# Patient Record
Sex: MALE | Race: ASIAN | URBAN - METROPOLITAN AREA
[De-identification: names, ages, dates, MRNs, and addresses within clinical notes are randomized per-mention and may not be internally consistent; named-entity substitution may affect disease eponyms.]

---

## 2017-04-11 ENCOUNTER — OFFICE VISIT (OUTPATIENT)
Dept: FAMILY MEDICINE | Facility: CLINIC | Age: 22
End: 2017-04-11

## 2017-04-11 VITALS
HEART RATE: 73 BPM | DIASTOLIC BLOOD PRESSURE: 69 MMHG | WEIGHT: 125 LBS | BODY MASS INDEX: 19.62 KG/M2 | SYSTOLIC BLOOD PRESSURE: 108 MMHG | TEMPERATURE: 97.5 F | HEIGHT: 67 IN

## 2017-04-11 DIAGNOSIS — M54.9 ACUTE UPPER BACK PAIN: Primary | ICD-10-CM

## 2017-04-11 PROBLEM — Z00.00 HEALTH CARE MAINTENANCE: Status: ACTIVE | Noted: 2017-04-11

## 2017-04-11 RX ORDER — NAPROXEN 500 MG/1
500 TABLET ORAL 2 TIMES DAILY PRN
Qty: 60 TABLET | Refills: 1 | Status: SHIPPED | OUTPATIENT
Start: 2017-04-11

## 2017-04-11 NOTE — PROGRESS NOTES
"Nursing Notes:   Tammi Blake, [facility]  4/11/2017  4:10 PM  Unsigned   name: Jules Gee  Language: Cynthia  Agency: [employer]  Phone number: [phone]    Chief Complaint   Patient presents with     Back Pain     upper back pain for about one month     Blood pressure 108/69, pulse 73, temperature 97.5  F (36.4  C), temperature source Oral, height 5' 7\" (170.2 cm), weight 125 lb (56.7 kg).  SUBJECTIVE:  Patient comes in today for upper back pain that he had for about the past month.  Patient states there is no precipitating factor.  He hasn't had trauma, car accidents, etc. for this.  He has no history of the same.     Patient is new to our clinic.  He has no previous medical problems and doesn't take medications.     Patient isn't currently working.  He notes that turning and moving aggravates the discomfort.  He isn't sure whether it has gotten better or worse over the past month.  He notes that turning makes the pain worse.  The pain doesn't really radiate anywhere.  It is located in the upper back between the shoulder blades.  Patient hasn't had neurologic problems.  He hasn't had strength changes.  He hasn't had sensation changes in the hands.  No radiation of the pain.     Patient has never been hospitalized and he doesn't have significant medical issues.     Patient denies cough, SOB, pain on respirations fever or chills, productive cough, weight loss, or other systemic symptoms    OBJECTIVE:     GENERAL:  This is a well-nourished, well-developed male who is in no acute distress.   HEART:  His heart has regular rate and rhythm w/out murmurs, rubs, or gallops.   LUNGS:  Clear to auscultation w/no wheezes, rales, or rhonchi.     MUSCULOSKELETAL:  He has essentially no tenderness on palpation.  He points to an area to the right of the midline, but more medial than the scapula, as the source of the pain, but on repeated palpation I'm unable to find an abnormality in this area.   IMAGING:  A chest x-ray was obtained " because of the unusual nature of the pain, and it returned as completely normal.  It was sent to Radiology for confirmation.     ASSESSMENT:  Acute upper back pain.     PLAN:  I discussed with patient that we'll start him on an NSAID.  If this is ineffective, patient will return and we'll consider physical therapy or other modalities for treatment of the pain.     An  is present throughout the visit and all the patient's questions were answered to his satisfaction prior to him leaving.     .       [facility] Mental Health screen is 0    Acute upper back pain  -     XR CHEST 2 VW  -     naproxen (NAPROSYN) 500 MG tablet; Take 1 tablet (500 mg) by mouth 2 times daily as needed for moderate pain        The patient was actively involved in the decision making process, and all the questions were answered to their satisfaction prior to leaving.

## 2017-04-11 NOTE — LETTER
April 13, 2017      [facility]  [address]  [place] [address]        Dear Million,    The radiologist agrees that your chest Xray is normal      Sincerely,    Andrei Mota MD

## 2017-04-11 NOTE — MR AVS SNAPSHOT
After Visit Summary   4/11/2017    Paula White    MRN: [id number]           Patient Information     Date Of Birth          1995        Visit Information        Provider Department      4/11/2017 4:10 PM Andrei Mota MD [facility]        Today's Diagnoses     Acute upper back pain    -  1      Care Instructions    Thank you for coming to [facility].    The phone number we will call with results is # [phone] (home) . If this is not the best number please call our clinic and change the number.  If you need any refills please call your pharmacy and they will contact us.  If you have any concerns about today's visit or wish to schedule another appointment please call our office during normal business hours [phone] (8-5:00 M-F)  If you have urgent medical concerns please call [phone] at any time of the day.  If you a medical emergency please call [phone]  Again thank you for choosing [facility] and please let us know how we can best partner with you to improve you and your family's health.            Follow-ups after your visit        Who to contact     Please call your clinic at [phone] to:    Ask questions about your health    Make or cancel appointments    Discuss your medicines    Learn about your test results    Speak to your doctor   If you have compliments or concerns about an experience at your clinic, or if you wish to file a complaint, please contact [facility] Patient Relations at [phone] or email us at [email or web address]         Additional Information About Your Visit        MyChart Information     [employer] is an electronic gateway that provides easy, online access to your medical records. With [employer], you can request a clinic appointment, read your test results, renew a prescription or communicate with your care team.     To sign up for [employer] visit the website at [email or web address]t   You will be  "asked to enter the access code listed below, as well as some personal information. Please follow the directions to create your username and password.     Your access code is: [email or web address]  Expires: 7/10/2017  4:51 PM     Your access code will  in 90 days. If you need help or a new code, please contact your [facility] or call [phone] for assistance.        Care EveryWhere ID     This is your Care EveryWhere ID. This could be used by other organizations to access your [place] medical records  [id number]        Your Vitals Were     Pulse Temperature Height BMI (Body Mass Index)          73 97.5  F (36.4  C) (Oral) 5' 7\" (170.2 cm) 19.58 kg/m2         Blood Pressure from Last 3 Encounters:   17 108/69    Weight from Last 3 Encounters:   17 125 lb (56.7 kg)              We Performed the Following     XR CHEST 2 VW          Today's Medication Changes          These changes are accurate as of: 17  4:51 PM.  If you have any questions, ask your nurse or doctor.               Start taking these medicines.        Dose/Directions    naproxen 500 MG tablet   Commonly known as:  NAPROSYN   Used for:  Acute upper back pain   Started by:  Andrei Mota MD        Dose:  500 mg   Take 1 tablet (500 mg) by mouth 2 times daily as needed for moderate pain   Quantity:  60 tablet   Refills:  1            Where to get your medicines      These medications were sent to [facility] - [place], [place] - [address], [place] [address]     Phone:  [phone]     naproxen 500 MG tablet                Primary Care Provider Office Phone # Fax #    Ness Cole [phone]       [address] [address]        Thank you!     Thank you for choosing [facility]  for your care. Our goal is always to provide you with excellent care. Hearing back from our patients is one way we can continue to improve our " services. Please take a few minutes to complete the written survey that you may receive in the mail after your visit with us. Thank you!             Your Updated Medication List - Protect others around you: Learn how to safely use, store and throw away your medicines at www.disposemymeds.org.          This list is accurate as of: 4/11/17  4:51 PM.  Always use your most recent med list.                   Brand Name Dispense Instructions for use    naproxen 500 MG tablet    NAPROSYN    60 tablet    Take 1 tablet (500 mg) by mouth 2 times daily as needed for moderate pain

## 2017-04-11 NOTE — NURSING NOTE
name: Jules Gee  Language: Cynthia  Agency: [employer]  Phone number: [phone]    Previous doctor in [place], does not have the information, will find it

## 2017-04-11 NOTE — PATIENT INSTRUCTIONS
Thank you for coming to [facility].    The phone number we will call with results is # [phone] (home) . If this is not the best number please call our clinic and change the number.  If you need any refills please call your pharmacy and they will contact us.  If you have any concerns about today's visit or wish to schedule another appointment please call our office during normal business hours [phone] (8-5:00 M-F)  If you have urgent medical concerns please call [phone] at any time of the day.  If you a medical emergency please call [phone]  Again thank you for choosing [facility] and please let us know how we can best partner with you to improve you and your family's health.